# Patient Record
Sex: FEMALE | Race: WHITE | NOT HISPANIC OR LATINO | Employment: OTHER
[De-identification: names, ages, dates, MRNs, and addresses within clinical notes are randomized per-mention and may not be internally consistent; named-entity substitution may affect disease eponyms.]

---

## 2017-08-05 ENCOUNTER — HOSPITAL (OUTPATIENT)
Dept: OTHER | Age: 57
End: 2017-08-05
Attending: EMERGENCY MEDICINE

## 2018-02-20 ENCOUNTER — HOSPITAL ENCOUNTER (EMERGENCY)
Facility: HOSPITAL | Age: 58
Discharge: HOME | End: 2018-02-20
Payer: COMMERCIAL

## 2018-02-20 ENCOUNTER — APPOINTMENT (OUTPATIENT)
Dept: GENERAL RADIOLOGY | Facility: HOSPITAL | Age: 58
End: 2018-02-20
Payer: COMMERCIAL

## 2018-02-20 VITALS
RESPIRATION RATE: 18 BRPM | DIASTOLIC BLOOD PRESSURE: 77 MMHG | TEMPERATURE: 97 F | HEART RATE: 85 BPM | BODY MASS INDEX: 36.86 KG/M2 | WEIGHT: 208 LBS | OXYGEN SATURATION: 96 % | HEIGHT: 63 IN | SYSTOLIC BLOOD PRESSURE: 141 MMHG
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
## 2018-02-20 DIAGNOSIS — S39.012A LUMBAR STRAIN, INITIAL ENCOUNTER: ICD-10-CM

## 2018-02-20 DIAGNOSIS — M54.40 BACK PAIN OF LUMBAR REGION WITH SCIATICA: ICD-10-CM

## 2018-02-20 DIAGNOSIS — S16.1XXA STRAIN OF NECK MUSCLE, INITIAL ENCOUNTER: Primary | ICD-10-CM

## 2018-02-20 PROCEDURE — 96372 THER/PROPH/DIAG INJ SC/IM: CPT

## 2018-02-20 PROCEDURE — 72050 X-RAY EXAM NECK SPINE 4/5VWS: CPT

## 2018-02-20 PROCEDURE — 99284 EMERGENCY DEPT VISIT MOD MDM: CPT

## 2018-02-20 PROCEDURE — 72110 X-RAY EXAM L-2 SPINE 4/>VWS: CPT

## 2018-02-20 RX ORDER — ENALAPRIL MALEATE 20 MG/1
20 TABLET ORAL DAILY
COMMUNITY

## 2018-02-20 RX ORDER — KETOROLAC TROMETHAMINE 30 MG/ML
30 INJECTION, SOLUTION INTRAMUSCULAR; INTRAVENOUS ONCE
Status: COMPLETED | OUTPATIENT
Start: 2018-02-20 | End: 2018-02-20
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
## 2019-11-13 ENCOUNTER — HOSPITAL (OUTPATIENT)
Dept: OTHER | Age: 59
End: 2019-11-13
Attending: INTERNAL MEDICINE

## 2019-11-25 ENCOUNTER — HOSPITAL (OUTPATIENT)
Dept: OTHER | Age: 59
End: 2019-11-25
Attending: INTERNAL MEDICINE

## 2019-11-26 LAB — GLUCOSE BLDC GLUCOMTR-MCNC: 78 MG/DL (ref 70–99)

## 2019-12-26 ENCOUNTER — HOSPITAL (OUTPATIENT)
Dept: OTHER | Age: 59
End: 2019-12-26
Attending: INTERNAL MEDICINE

## 2020-01-24 ENCOUNTER — HOSPITAL (OUTPATIENT)
Dept: OTHER | Age: 60
End: 2020-01-24
Attending: INTERNAL MEDICINE

## 2020-01-31 LAB — PATH REPORT, FNA: NORMAL

## 2020-02-14 ENCOUNTER — HOSPITAL (OUTPATIENT)
Dept: OTHER | Age: 60
End: 2020-02-14
Attending: INTERNAL MEDICINE

## 2020-03-20 ENCOUNTER — HOSPITAL (OUTPATIENT)
Dept: OTHER | Age: 60
End: 2020-03-20
Attending: INTERNAL MEDICINE

## 2020-04-30 ENCOUNTER — HOSPITAL (OUTPATIENT)
Dept: OTHER | Age: 60
End: 2020-04-30
Attending: INTERNAL MEDICINE

## 2020-05-22 ENCOUNTER — HOSPITAL (OUTPATIENT)
Dept: OTHER | Age: 60
End: 2020-05-22
Attending: INTERNAL MEDICINE

## 2020-05-26 LAB — GLUCOSE BLDC GLUCOMTR-MCNC: 88 MG/DL (ref 70–99)

## 2021-12-09 ENCOUNTER — LAB REQUISITION (OUTPATIENT)
Dept: LAB | Age: 61
End: 2021-12-09

## 2021-12-09 ENCOUNTER — LAB SERVICES (OUTPATIENT)
Dept: LAB | Age: 61
End: 2021-12-09

## 2021-12-09 DIAGNOSIS — E03.9 HYPOTHYROIDISM, UNSPECIFIED: ICD-10-CM

## 2021-12-09 DIAGNOSIS — Z12.4 ENCOUNTER FOR SCREENING FOR MALIGNANT NEOPLASM OF CERVIX: ICD-10-CM

## 2021-12-09 DIAGNOSIS — E78.2 MIXED HYPERLIPIDEMIA: ICD-10-CM

## 2021-12-09 DIAGNOSIS — R53.83 OTHER FATIGUE: ICD-10-CM

## 2021-12-09 DIAGNOSIS — E55.9 VITAMIN D DEFICIENCY, UNSPECIFIED: ICD-10-CM

## 2021-12-09 DIAGNOSIS — R73.09 OTHER ABNORMAL GLUCOSE: ICD-10-CM

## 2021-12-09 LAB
BASOPHILS # BLD: 0 K/MCL (ref 0–0.3)
BASOPHILS NFR BLD: 1 %
DEPRECATED RDW RBC: 52 FL (ref 39–50)
EOSINOPHIL # BLD: 0.2 K/MCL (ref 0–0.5)
EOSINOPHIL NFR BLD: 3 %
ERYTHROCYTE [DISTWIDTH] IN BLOOD: 14.3 % (ref 11–15)
HCT VFR BLD CALC: 44.6 % (ref 36–46.5)
HGB BLD-MCNC: 14.8 G/DL (ref 12–15.5)
IMM GRANULOCYTES # BLD AUTO: 0 K/MCL (ref 0–0.2)
IMM GRANULOCYTES # BLD: 0 %
LYMPHOCYTES # BLD: 1.6 K/MCL (ref 1–4)
LYMPHOCYTES NFR BLD: 20 %
MCH RBC QN AUTO: 32.7 PG (ref 26–34)
MCHC RBC AUTO-ENTMCNC: 33.2 G/DL (ref 32–36.5)
MCV RBC AUTO: 98.5 FL (ref 78–100)
MONOCYTES # BLD: 0.6 K/MCL (ref 0.3–0.9)
MONOCYTES NFR BLD: 8 %
NEUTROPHILS # BLD: 5.2 K/MCL (ref 1.8–7.7)
NEUTROPHILS NFR BLD: 68 %
NRBC BLD MANUAL-RTO: 0 /100 WBC
PLATELET # BLD AUTO: 272 K/MCL (ref 140–450)
RBC # BLD: 4.53 MIL/MCL (ref 4–5.2)
WBC # BLD: 7.6 K/MCL (ref 4.2–11)

## 2021-12-09 PROCEDURE — 36415 COLL VENOUS BLD VENIPUNCTURE: CPT | Performed by: CLINICAL MEDICAL LABORATORY

## 2021-12-09 PROCEDURE — 87624 HPV HI-RISK TYP POOLED RSLT: CPT | Performed by: CLINICAL MEDICAL LABORATORY

## 2021-12-09 PROCEDURE — 83036 HEMOGLOBIN GLYCOSYLATED A1C: CPT | Performed by: CLINICAL MEDICAL LABORATORY

## 2021-12-09 PROCEDURE — 88175 CYTOPATH C/V AUTO FLUID REDO: CPT | Performed by: CLINICAL MEDICAL LABORATORY

## 2021-12-09 PROCEDURE — 80061 LIPID PANEL: CPT | Performed by: CLINICAL MEDICAL LABORATORY

## 2021-12-09 PROCEDURE — 80050 GENERAL HEALTH PANEL: CPT | Performed by: CLINICAL MEDICAL LABORATORY

## 2021-12-09 PROCEDURE — 84439 ASSAY OF FREE THYROXINE: CPT | Performed by: CLINICAL MEDICAL LABORATORY

## 2021-12-09 PROCEDURE — 82306 VITAMIN D 25 HYDROXY: CPT | Performed by: CLINICAL MEDICAL LABORATORY

## 2021-12-10 LAB
25(OH)D3+25(OH)D2 SERPL-MCNC: 42.4 NG/ML (ref 30–100)
ALBUMIN SERPL-MCNC: 3.6 G/DL (ref 3.6–5.1)
ALBUMIN/GLOB SERPL: 1 {RATIO} (ref 1–2.4)
ALP SERPL-CCNC: 71 UNITS/L (ref 45–117)
ALT SERPL-CCNC: 26 UNITS/L
ANION GAP SERPL CALC-SCNC: 9 MMOL/L (ref 10–20)
AST SERPL-CCNC: 22 UNITS/L
BILIRUB SERPL-MCNC: 0.4 MG/DL (ref 0.2–1)
BUN SERPL-MCNC: 9 MG/DL (ref 6–20)
BUN/CREAT SERPL: 14 (ref 7–25)
CALCIUM SERPL-MCNC: 10 MG/DL (ref 8.4–10.2)
CHLORIDE SERPL-SCNC: 106 MMOL/L (ref 98–107)
CHOLEST SERPL-MCNC: 258 MG/DL
CHOLEST/HDLC SERPL: 6.5 {RATIO}
CO2 SERPL-SCNC: 31 MMOL/L (ref 21–32)
CREAT SERPL-MCNC: 0.65 MG/DL (ref 0.51–0.95)
FASTING DURATION TIME PATIENT: ABNORMAL H
FASTING DURATION TIME PATIENT: ABNORMAL H
GFR SERPLBLD BASED ON 1.73 SQ M-ARVRAT: >90 ML/MIN
GLOBULIN SER-MCNC: 3.6 G/DL (ref 2–4)
GLUCOSE SERPL-MCNC: 92 MG/DL (ref 70–99)
HBA1C MFR BLD: 5.9 % (ref 4.5–5.6)
HDLC SERPL-MCNC: 40 MG/DL
LDLC SERPL CALC-MCNC: 172 MG/DL
NONHDLC SERPL-MCNC: 218 MG/DL
POTASSIUM SERPL-SCNC: 4.7 MMOL/L (ref 3.4–5.1)
PROT SERPL-MCNC: 7.2 G/DL (ref 6.4–8.2)
SODIUM SERPL-SCNC: 141 MMOL/L (ref 135–145)
T4 FREE SERPL-MCNC: 0.9 NG/DL (ref 0.8–1.5)
TRIGL SERPL-MCNC: 230 MG/DL
TSH SERPL-ACNC: 2.25 MCUNITS/ML (ref 0.35–5)

## 2021-12-14 LAB
CASE RPRT: NORMAL
CLINICAL INFO: NORMAL
CYTOLOGY CVX/VAG STUDY: NORMAL
HPV16+18+45 E6+E7MRNA CVX NAA+PROBE: NEGATIVE
Lab: NORMAL
PAP EDUCATIONAL NOTE: NORMAL
SPECIMEN ADEQUACY: NORMAL

## 2022-01-11 ENCOUNTER — IMAGING SERVICES (OUTPATIENT)
Dept: OTHER | Age: 62
End: 2022-01-11

## 2022-05-10 ENCOUNTER — TELEPHONE (OUTPATIENT)
Dept: CARDIOLOGY | Age: 62
End: 2022-05-10

## 2022-05-10 ENCOUNTER — APPOINTMENT (OUTPATIENT)
Dept: PET IMAGING | Age: 62
End: 2022-05-10
Attending: INTERNAL MEDICINE

## 2022-05-12 ENCOUNTER — HOSPITAL ENCOUNTER (OUTPATIENT)
Dept: PET IMAGING | Age: 62
Discharge: STILL ADMITTED OR ADMINISTRATIVE | End: 2022-05-12
Attending: INTERNAL MEDICINE

## 2022-05-12 VITALS — HEIGHT: 62 IN | WEIGHT: 215.61 LBS | BODY MASS INDEX: 39.68 KG/M2

## 2022-05-12 DIAGNOSIS — C34.90 SMALL CELL LUNG CANCER (CMD): ICD-10-CM

## 2022-05-12 LAB — GLUCOSE BLDC GLUCOMTR-MCNC: 105 MG/DL (ref 70–99)

## 2022-05-12 PROCEDURE — 78815 PET IMAGE W/CT SKULL-THIGH: CPT

## 2022-05-12 PROCEDURE — 10006150 HB RX 343: Performed by: INTERNAL MEDICINE

## 2022-05-12 PROCEDURE — A9552 F18 FDG: HCPCS | Performed by: INTERNAL MEDICINE

## 2022-05-12 PROCEDURE — 82962 GLUCOSE BLOOD TEST: CPT

## 2022-05-12 RX ORDER — FLUDEOXYGLUCOSE F-18 300 MCI/ML
16.3 INJECTION INTRAVENOUS ONCE
Status: COMPLETED | OUTPATIENT
Start: 2022-05-12 | End: 2022-05-12

## 2022-05-12 RX ADMIN — FLUDEOXYGLUCOSE F-18 16.3 MILLICURIE: 300 INJECTION INTRAVENOUS at 10:13

## 2022-08-18 ENCOUNTER — APPOINTMENT (OUTPATIENT)
Dept: CARDIOLOGY | Age: 62
DRG: 271 | End: 2022-08-18
Attending: STUDENT IN AN ORGANIZED HEALTH CARE EDUCATION/TRAINING PROGRAM

## 2022-08-18 ENCOUNTER — HOSPITAL ENCOUNTER (INPATIENT)
Age: 62
LOS: 5 days | Discharge: HOME | DRG: 271 | End: 2022-08-23
Attending: EMERGENCY MEDICINE | Admitting: INTERNAL MEDICINE

## 2022-08-18 ENCOUNTER — APPOINTMENT (OUTPATIENT)
Dept: CT IMAGING | Age: 62
DRG: 271 | End: 2022-08-18
Attending: STUDENT IN AN ORGANIZED HEALTH CARE EDUCATION/TRAINING PROGRAM

## 2022-08-18 ENCOUNTER — APPOINTMENT (OUTPATIENT)
Dept: GENERAL RADIOLOGY | Age: 62
DRG: 271 | End: 2022-08-18
Attending: EMERGENCY MEDICINE

## 2022-08-18 DIAGNOSIS — I31.39 PERICARDIAL EFFUSION: Primary | ICD-10-CM

## 2022-08-18 LAB
ABO + RH BLD: NORMAL
ALBUMIN SERPL-MCNC: 3.5 G/DL (ref 3.6–5.1)
ALBUMIN/GLOB SERPL: 1 {RATIO} (ref 1–2.4)
ALP SERPL-CCNC: 56 UNITS/L (ref 45–117)
ALT SERPL-CCNC: 25 UNITS/L
ANION GAP SERPL CALC-SCNC: 8 MMOL/L (ref 7–19)
AORTIC VALVE AREA: NORMAL
APTT PPP: 26 SEC (ref 22–30)
ASCENDING AORTA (AAD): 2.8
AST SERPL-CCNC: 23 UNITS/L
ATRIAL RATE (BPM): 99
AV MEAN GRADIENT (AVMG): NORMAL
AV MEAN VELOCITY (AVMV): NORMAL
AV PEAK GRADIENT (AVPG): NORMAL
AV PEAK VELOCITY (AVPV): NORMAL
AV STENOSIS SEVERITY TEXT: NORMAL
BASOPHILS # BLD: 0 K/MCL (ref 0–0.3)
BASOPHILS NFR BLD: 0 %
BILIRUB SERPL-MCNC: 0.4 MG/DL (ref 0.2–1)
BLD GP AB SCN SERPL QL GEL: NEGATIVE
BUN SERPL-MCNC: 8 MG/DL (ref 6–20)
BUN/CREAT SERPL: 12 (ref 7–25)
CALCIUM SERPL-MCNC: 9.7 MG/DL (ref 8.4–10.2)
CHLORIDE SERPL-SCNC: 109 MMOL/L (ref 97–110)
CO2 SERPL-SCNC: 26 MMOL/L (ref 21–32)
CREAT SERPL-MCNC: 0.68 MG/DL (ref 0.51–0.95)
DEPRECATED RDW RBC: 56.4 FL (ref 39–50)
E WAVE DECELARATION TIME (MDT): 137
EOSINOPHIL # BLD: 0 K/MCL (ref 0–0.5)
EOSINOPHIL NFR BLD: 0 %
ERYTHROCYTE [DISTWIDTH] IN BLOOD: 15.8 % (ref 11–15)
FASTING DURATION TIME PATIENT: ABNORMAL H
FLUAV RNA RESP QL NAA+PROBE: NOT DETECTED
FLUBV RNA RESP QL NAA+PROBE: NOT DETECTED
GFR SERPLBLD BASED ON 1.73 SQ M-ARVRAT: >90 ML/MIN
GLOBULIN SER-MCNC: 3.6 G/DL (ref 2–4)
GLUCOSE SERPL-MCNC: 102 MG/DL (ref 70–99)
HCT VFR BLD CALC: 35.2 % (ref 36–46.5)
HGB BLD-MCNC: 12 G/DL (ref 12–15.5)
IMM GRANULOCYTES # BLD AUTO: 0.1 K/MCL (ref 0–0.2)
IMM GRANULOCYTES # BLD: 1 %
INR PPP: 1
LEFT INTERNAL DIMENSION IN SYSTOLE (LVSD): 4.01
LEFT VENTRICULAR INTERNAL DIMENSION IN DIASTOLE (LVDD): 4.94
LV EF: NORMAL %
LYMPHOCYTES # BLD: 1.1 K/MCL (ref 1–4)
LYMPHOCYTES NFR BLD: 20 %
MCH RBC QN AUTO: 33.4 PG (ref 26–34)
MCHC RBC AUTO-ENTMCNC: 34.1 G/DL (ref 32–36.5)
MCV RBC AUTO: 98.1 FL (ref 78–100)
MONOCYTES # BLD: 0.7 K/MCL (ref 0.3–0.9)
MONOCYTES NFR BLD: 13 %
MV E TISSUE VEL LAT (MELV): 9.46
MV E TISSUE VEL MED (MESV): 7.62
MV E WAVE VEL/E TISSUE VEL MED(MSR): 15.49
NEUTROPHILS # BLD: 3.5 K/MCL (ref 1.8–7.7)
NEUTROPHILS NFR BLD: 66 %
NRBC BLD MANUAL-RTO: 0 /100 WBC
NT-PROBNP SERPL-MCNC: 78 PG/ML
P AXIS (DEGREES): 50
PLATELET # BLD AUTO: 157 K/MCL (ref 140–450)
POTASSIUM SERPL-SCNC: 4.1 MMOL/L (ref 3.4–5.1)
PR-INTERVAL (MSEC): 168
PROT SERPL-MCNC: 7.1 G/DL (ref 6.4–8.2)
PROTHROMBIN TIME: 10.4 SEC (ref 9.7–11.8)
QRS-INTERVAL (MSEC): 74
QT-INTERVAL (MSEC): 344
QTC: 441
R AXIS (DEGREES): 33
RAINBOW EXTRA TUBES HOLD SPECIMEN: NORMAL
RBC # BLD: 3.59 MIL/MCL (ref 4–5.2)
REPORT TEXT: NORMAL
RSV AG NPH QL IA.RAPID: NOT DETECTED
SARS-COV-2 RNA RESP QL NAA+PROBE: NOT DETECTED
SERVICE CMNT-IMP: NORMAL
SERVICE CMNT-IMP: NORMAL
SODIUM SERPL-SCNC: 139 MMOL/L (ref 135–145)
T AXIS (DEGREES): 61
TRICUSPID ANNULAR PLANE SYSTOLIC EXCURSION (TAPSE): 2.81
TROPONIN I SERPL DL<=0.01 NG/ML-MCNC: 6 NG/L
TYPE AND SCREEN EXPIRATION DATE: NORMAL
VENTRICULAR RATE EKG/MIN (BPM): 99
WBC # BLD: 5.3 K/MCL (ref 4.2–11)

## 2022-08-18 PROCEDURE — 80053 COMPREHEN METABOLIC PANEL: CPT | Performed by: EMERGENCY MEDICINE

## 2022-08-18 PROCEDURE — 71250 CT THORAX DX C-: CPT

## 2022-08-18 PROCEDURE — 93005 ELECTROCARDIOGRAM TRACING: CPT | Performed by: EMERGENCY MEDICINE

## 2022-08-18 PROCEDURE — 99285 EMERGENCY DEPT VISIT HI MDM: CPT | Performed by: EMERGENCY MEDICINE

## 2022-08-18 PROCEDURE — 0241U COVID/FLU/RSV PANEL: CPT | Performed by: EMERGENCY MEDICINE

## 2022-08-18 PROCEDURE — G1004 CDSM NDSC: HCPCS

## 2022-08-18 PROCEDURE — 10002803 HB RX 637: Performed by: STUDENT IN AN ORGANIZED HEALTH CARE EDUCATION/TRAINING PROGRAM

## 2022-08-18 PROCEDURE — C9803 HOPD COVID-19 SPEC COLLECT: HCPCS

## 2022-08-18 PROCEDURE — 85610 PROTHROMBIN TIME: CPT | Performed by: EMERGENCY MEDICINE

## 2022-08-18 PROCEDURE — 83880 ASSAY OF NATRIURETIC PEPTIDE: CPT | Performed by: EMERGENCY MEDICINE

## 2022-08-18 PROCEDURE — 10002800 HB RX 250 W HCPCS: Performed by: STUDENT IN AN ORGANIZED HEALTH CARE EDUCATION/TRAINING PROGRAM

## 2022-08-18 PROCEDURE — 71045 X-RAY EXAM CHEST 1 VIEW: CPT

## 2022-08-18 PROCEDURE — 93306 TTE W/DOPPLER COMPLETE: CPT | Performed by: INTERNAL MEDICINE

## 2022-08-18 PROCEDURE — 86900 BLOOD TYPING SEROLOGIC ABO: CPT | Performed by: EMERGENCY MEDICINE

## 2022-08-18 PROCEDURE — 93010 ELECTROCARDIOGRAM REPORT: CPT | Performed by: INTERNAL MEDICINE

## 2022-08-18 PROCEDURE — 85730 THROMBOPLASTIN TIME PARTIAL: CPT | Performed by: EMERGENCY MEDICINE

## 2022-08-18 PROCEDURE — 36415 COLL VENOUS BLD VENIPUNCTURE: CPT

## 2022-08-18 PROCEDURE — 84484 ASSAY OF TROPONIN QUANT: CPT | Performed by: EMERGENCY MEDICINE

## 2022-08-18 PROCEDURE — 85025 COMPLETE CBC W/AUTO DIFF WBC: CPT | Performed by: EMERGENCY MEDICINE

## 2022-08-18 PROCEDURE — 10006031 HB ROOM CHARGE TELEMETRY

## 2022-08-18 PROCEDURE — 99285 EMERGENCY DEPT VISIT HI MDM: CPT

## 2022-08-18 PROCEDURE — 10004651 HB RX, NO CHARGE ITEM: Performed by: STUDENT IN AN ORGANIZED HEALTH CARE EDUCATION/TRAINING PROGRAM

## 2022-08-18 PROCEDURE — 99223 1ST HOSP IP/OBS HIGH 75: CPT | Performed by: STUDENT IN AN ORGANIZED HEALTH CARE EDUCATION/TRAINING PROGRAM

## 2022-08-18 PROCEDURE — 93306 TTE W/DOPPLER COMPLETE: CPT

## 2022-08-18 RX ORDER — 0.9 % SODIUM CHLORIDE 0.9 %
2 VIAL (ML) INJECTION EVERY 12 HOURS SCHEDULED
Status: DISCONTINUED | OUTPATIENT
Start: 2022-08-18 | End: 2022-08-23 | Stop reason: HOSPADM

## 2022-08-18 RX ORDER — HEPARIN SODIUM 5000 [USP'U]/ML
5000 INJECTION, SOLUTION INTRAVENOUS; SUBCUTANEOUS EVERY 8 HOURS SCHEDULED
Status: DISCONTINUED | OUTPATIENT
Start: 2022-08-18 | End: 2022-08-19

## 2022-08-18 RX ORDER — SERTRALINE HYDROCHLORIDE 25 MG/1
25 TABLET, FILM COATED ORAL DAILY
Status: DISCONTINUED | OUTPATIENT
Start: 2022-08-18 | End: 2022-08-23 | Stop reason: HOSPADM

## 2022-08-18 RX ORDER — SERTRALINE HYDROCHLORIDE 25 MG/1
25 TABLET, FILM COATED ORAL DAILY
COMMUNITY
Start: 2022-06-19

## 2022-08-18 RX ORDER — VITAMIN B COMPLEX
1 TABLET ORAL DAILY
COMMUNITY

## 2022-08-18 RX ADMIN — SERTRALINE HYDROCHLORIDE 25 MG: 25 TABLET, FILM COATED ORAL at 17:08

## 2022-08-18 RX ADMIN — HEPARIN SODIUM 5000 UNITS: 5000 INJECTION INTRAVENOUS; SUBCUTANEOUS at 22:20

## 2022-08-18 RX ADMIN — HEPARIN SODIUM 5000 UNITS: 5000 INJECTION INTRAVENOUS; SUBCUTANEOUS at 17:09

## 2022-08-18 RX ADMIN — SODIUM CHLORIDE, PRESERVATIVE FREE 2 ML: 5 INJECTION INTRAVENOUS at 22:24

## 2022-08-18 ASSESSMENT — ENCOUNTER SYMPTOMS
SORE THROAT: 0
HEADACHES: 0
ABDOMINAL PAIN: 0
RHINORRHEA: 0
FATIGUE: 1
DIARRHEA: 0
CHILLS: 0
POLYDIPSIA: 0
ADENOPATHY: 0
FEVER: 0
WEAKNESS: 0
NUMBNESS: 0
COLOR CHANGE: 0
SHORTNESS OF BREATH: 0
NAUSEA: 0
VOMITING: 0
COUGH: 0
CONSTIPATION: 0

## 2022-08-18 ASSESSMENT — ACTIVITIES OF DAILY LIVING (ADL)
ADL_SCORE: 12
ADL_BEFORE_ADMISSION: INDEPENDENT
CHRONIC_PAIN_PRESENT: NO
RECENT_DECLINE_ADL: NO
ADL_SHORT_OF_BREATH: NO

## 2022-08-18 ASSESSMENT — LIFESTYLE VARIABLES
AUDIT-C TOTAL SCORE: 0
HOW OFTEN DO YOU HAVE A DRINK CONTAINING ALCOHOL: NEVER
HOW OFTEN DO YOU HAVE 6 OR MORE DRINKS ON ONE OCCASION: NEVER
ALCOHOL_USE_STATUS: NO OR LOW RISK WITH VALIDATED TOOL
HOW MANY STANDARD DRINKS CONTAINING ALCOHOL DO YOU HAVE ON A TYPICAL DAY: 0,1 OR 2

## 2022-08-18 ASSESSMENT — PAIN SCALES - GENERAL
PAINLEVEL_OUTOF10: 0

## 2022-08-18 ASSESSMENT — COLUMBIA-SUICIDE SEVERITY RATING SCALE - C-SSRS
IS THE PATIENT ABLE TO COMPLETE C-SSRS: YES
6. HAVE YOU EVER DONE ANYTHING, STARTED TO DO ANYTHING, OR PREPARED TO DO ANYTHING TO END YOUR LIFE?: NO
2. HAVE YOU ACTUALLY HAD ANY THOUGHTS OF KILLING YOURSELF?: NO
1. WITHIN THE PAST MONTH, HAVE YOU WISHED YOU WERE DEAD OR WISHED YOU COULD GO TO SLEEP AND NOT WAKE UP?: NO

## 2022-08-18 ASSESSMENT — COGNITIVE AND FUNCTIONAL STATUS - GENERAL
DO YOU HAVE DIFFICULTY DRESSING OR BATHING: NO
BECAUSE OF A PHYSICAL, MENTAL, OR EMOTIONAL CONDITION, DO YOU HAVE DIFFICULTY DOING ERRANDS ALONE: NO
ARE YOU DEAF OR DO YOU HAVE SERIOUS DIFFICULTY  HEARING: NO
ARE YOU BLIND OR DO YOU HAVE SERIOUS DIFFICULTY SEEING, EVEN WHEN WEARING GLASSES: NO
BECAUSE OF A PHYSICAL, MENTAL, OR EMOTIONAL CONDITION, DO YOU HAVE SERIOUS DIFFICULTY CONCENTRATING, REMEMBERING OR MAKING DECISIONS: NO
DO YOU HAVE SERIOUS DIFFICULTY WALKING OR CLIMBING STAIRS: NO

## 2022-08-18 ASSESSMENT — PATIENT HEALTH QUESTIONNAIRE - PHQ9
SUM OF ALL RESPONSES TO PHQ9 QUESTIONS 1 AND 2: 1
CLINICAL INTERPRETATION OF PHQ2 SCORE: NO FURTHER SCREENING NEEDED
IS PATIENT ABLE TO COMPLETE PHQ2 OR PHQ9: YES
2. FEELING DOWN, DEPRESSED OR HOPELESS: SEVERAL DAYS
1. LITTLE INTEREST OR PLEASURE IN DOING THINGS: NOT AT ALL
SUM OF ALL RESPONSES TO PHQ9 QUESTIONS 1 AND 2: 1

## 2022-08-18 ASSESSMENT — PAIN SCALES - WONG BAKER: WONGBAKER_NUMERICALRESPONSE: 1

## 2022-08-19 ENCOUNTER — ANESTHESIA (OUTPATIENT)
Dept: SURGERY | Age: 62
DRG: 271 | End: 2022-08-19

## 2022-08-19 ENCOUNTER — APPOINTMENT (OUTPATIENT)
Dept: GENERAL RADIOLOGY | Age: 62
DRG: 271 | End: 2022-08-19

## 2022-08-19 ENCOUNTER — ANESTHESIA EVENT (OUTPATIENT)
Dept: SURGERY | Age: 62
DRG: 271 | End: 2022-08-19

## 2022-08-19 LAB
ALBUMIN SERPL-MCNC: 3.3 G/DL (ref 3.6–5.1)
ALBUMIN/GLOB SERPL: 0.9 {RATIO} (ref 1–2.4)
ALP SERPL-CCNC: 54 UNITS/L (ref 45–117)
ALT SERPL-CCNC: 26 UNITS/L
ANION GAP SERPL CALC-SCNC: 9 MMOL/L (ref 7–19)
AST SERPL-CCNC: 26 UNITS/L
BASOPHILS # BLD: 0 K/MCL (ref 0–0.3)
BASOPHILS NFR BLD: 0 %
BILIRUB SERPL-MCNC: 0.4 MG/DL (ref 0.2–1)
BUN SERPL-MCNC: 7 MG/DL (ref 6–20)
BUN/CREAT SERPL: 10 (ref 7–25)
CALCIUM SERPL-MCNC: 9.9 MG/DL (ref 8.4–10.2)
CHLORIDE SERPL-SCNC: 107 MMOL/L (ref 97–110)
CO2 SERPL-SCNC: 29 MMOL/L (ref 21–32)
CREAT SERPL-MCNC: 0.68 MG/DL (ref 0.51–0.95)
DEPRECATED RDW RBC: 55.6 FL (ref 39–50)
EOSINOPHIL # BLD: 0 K/MCL (ref 0–0.5)
EOSINOPHIL NFR BLD: 1 %
ERYTHROCYTE [DISTWIDTH] IN BLOOD: 15.7 % (ref 11–15)
FASTING DURATION TIME PATIENT: ABNORMAL H
GFR SERPLBLD BASED ON 1.73 SQ M-ARVRAT: >90 ML/MIN
GLOBULIN SER-MCNC: 3.7 G/DL (ref 2–4)
GLUCOSE SERPL-MCNC: 104 MG/DL (ref 70–99)
HCT VFR BLD CALC: 37.1 % (ref 36–46.5)
HGB BLD-MCNC: 12.5 G/DL (ref 12–15.5)
IMM GRANULOCYTES # BLD AUTO: 0 K/MCL (ref 0–0.2)
IMM GRANULOCYTES # BLD: 0 %
LYMPHOCYTES # BLD: 1.2 K/MCL (ref 1–4)
LYMPHOCYTES NFR BLD: 29 %
MAGNESIUM SERPL-MCNC: 1.8 MG/DL (ref 1.7–2.4)
MCH RBC QN AUTO: 32.9 PG (ref 26–34)
MCHC RBC AUTO-ENTMCNC: 33.7 G/DL (ref 32–36.5)
MCV RBC AUTO: 97.6 FL (ref 78–100)
MONOCYTES # BLD: 0.6 K/MCL (ref 0.3–0.9)
MONOCYTES NFR BLD: 15 %
NEUTROPHILS # BLD: 2.4 K/MCL (ref 1.8–7.7)
NEUTROPHILS NFR BLD: 55 %
NRBC BLD MANUAL-RTO: 0 /100 WBC
PLATELET # BLD AUTO: 153 K/MCL (ref 140–450)
POTASSIUM SERPL-SCNC: 4.5 MMOL/L (ref 3.4–5.1)
PROT SERPL-MCNC: 7 G/DL (ref 6.4–8.2)
RBC # BLD: 3.8 MIL/MCL (ref 4–5.2)
SODIUM SERPL-SCNC: 140 MMOL/L (ref 135–145)
TSH SERPL-ACNC: 4.49 MCUNITS/ML (ref 0.35–5)
WBC # BLD: 4.3 K/MCL (ref 4.2–11)

## 2022-08-19 PROCEDURE — 0W9D0ZZ DRAINAGE OF PERICARDIAL CAVITY, OPEN APPROACH: ICD-10-PCS | Performed by: THORACIC SURGERY (CARDIOTHORACIC VASCULAR SURGERY)

## 2022-08-19 PROCEDURE — 10002801 HB RX 250 W/O HCPCS: Performed by: STUDENT IN AN ORGANIZED HEALTH CARE EDUCATION/TRAINING PROGRAM

## 2022-08-19 PROCEDURE — 13000002 HB ANESTHESIA  GENERAL  S/U + 1ST 15 MIN: Performed by: THORACIC SURGERY (CARDIOTHORACIC VASCULAR SURGERY)

## 2022-08-19 PROCEDURE — 10004651 HB RX, NO CHARGE ITEM: Performed by: STUDENT IN AN ORGANIZED HEALTH CARE EDUCATION/TRAINING PROGRAM

## 2022-08-19 PROCEDURE — 10002801 HB RX 250 W/O HCPCS: Performed by: THORACIC SURGERY (CARDIOTHORACIC VASCULAR SURGERY)

## 2022-08-19 PROCEDURE — 88341 IMHCHEM/IMCYTCHM EA ADD ANTB: CPT | Performed by: THORACIC SURGERY (CARDIOTHORACIC VASCULAR SURGERY)

## 2022-08-19 PROCEDURE — C1729 CATH, DRAINAGE: HCPCS | Performed by: THORACIC SURGERY (CARDIOTHORACIC VASCULAR SURGERY)

## 2022-08-19 PROCEDURE — 0W9B00Z DRAINAGE OF LEFT PLEURAL CAVITY WITH DRAINAGE DEVICE, OPEN APPROACH: ICD-10-PCS | Performed by: THORACIC SURGERY (CARDIOTHORACIC VASCULAR SURGERY)

## 2022-08-19 PROCEDURE — 10002807 HB RX 258: Performed by: STUDENT IN AN ORGANIZED HEALTH CARE EDUCATION/TRAINING PROGRAM

## 2022-08-19 PROCEDURE — 85025 COMPLETE CBC W/AUTO DIFF WBC: CPT | Performed by: STUDENT IN AN ORGANIZED HEALTH CARE EDUCATION/TRAINING PROGRAM

## 2022-08-19 PROCEDURE — 87205 SMEAR GRAM STAIN: CPT | Performed by: THORACIC SURGERY (CARDIOTHORACIC VASCULAR SURGERY)

## 2022-08-19 PROCEDURE — 13000102 HB CARDIO-THORACIC COMPLEX CASE S/U + 1ST 15 MIN: Performed by: THORACIC SURGERY (CARDIOTHORACIC VASCULAR SURGERY)

## 2022-08-19 PROCEDURE — 10006023 HB SUPPLY 272: Performed by: THORACIC SURGERY (CARDIOTHORACIC VASCULAR SURGERY)

## 2022-08-19 PROCEDURE — 10002800 HB RX 250 W HCPCS: Performed by: STUDENT IN AN ORGANIZED HEALTH CARE EDUCATION/TRAINING PROGRAM

## 2022-08-19 PROCEDURE — 80053 COMPREHEN METABOLIC PANEL: CPT | Performed by: STUDENT IN AN ORGANIZED HEALTH CARE EDUCATION/TRAINING PROGRAM

## 2022-08-19 PROCEDURE — 10003585 HB ROOM CHARGE INTERMEDIATE CARE

## 2022-08-19 PROCEDURE — 10002803 HB RX 637: Performed by: STUDENT IN AN ORGANIZED HEALTH CARE EDUCATION/TRAINING PROGRAM

## 2022-08-19 PROCEDURE — 13000103 HB CARDIO-THORACIC COMPLEX CASE EA ADD MINUTE: Performed by: THORACIC SURGERY (CARDIOTHORACIC VASCULAR SURGERY)

## 2022-08-19 PROCEDURE — 13000003 HB ANESTHESIA  GENERAL EA ADD MINUTE: Performed by: THORACIC SURGERY (CARDIOTHORACIC VASCULAR SURGERY)

## 2022-08-19 PROCEDURE — 0BJ08ZZ INSPECTION OF TRACHEOBRONCHIAL TREE, VIA NATURAL OR ARTIFICIAL OPENING ENDOSCOPIC: ICD-10-PCS | Performed by: THORACIC SURGERY (CARDIOTHORACIC VASCULAR SURGERY)

## 2022-08-19 PROCEDURE — 36415 COLL VENOUS BLD VENIPUNCTURE: CPT | Performed by: STUDENT IN AN ORGANIZED HEALTH CARE EDUCATION/TRAINING PROGRAM

## 2022-08-19 PROCEDURE — 88305 TISSUE EXAM BY PATHOLOGIST: CPT | Performed by: THORACIC SURGERY (CARDIOTHORACIC VASCULAR SURGERY)

## 2022-08-19 PROCEDURE — 71045 X-RAY EXAM CHEST 1 VIEW: CPT

## 2022-08-19 PROCEDURE — 10004452 HB PACU ADDL 30 MINUTES: Performed by: THORACIC SURGERY (CARDIOTHORACIC VASCULAR SURGERY)

## 2022-08-19 PROCEDURE — 84443 ASSAY THYROID STIM HORMONE: CPT | Performed by: STUDENT IN AN ORGANIZED HEALTH CARE EDUCATION/TRAINING PROGRAM

## 2022-08-19 PROCEDURE — 10004451 HB PACU RECOVERY 1ST 30 MINUTES: Performed by: THORACIC SURGERY (CARDIOTHORACIC VASCULAR SURGERY)

## 2022-08-19 PROCEDURE — 83735 ASSAY OF MAGNESIUM: CPT | Performed by: STUDENT IN AN ORGANIZED HEALTH CARE EDUCATION/TRAINING PROGRAM

## 2022-08-19 RX ORDER — SODIUM CHLORIDE 9 MG/ML
INJECTION, SOLUTION INTRAVENOUS CONTINUOUS
Status: DISCONTINUED | OUTPATIENT
Start: 2022-08-19 | End: 2022-08-19

## 2022-08-19 RX ORDER — BUPIVACAINE HYDROCHLORIDE AND EPINEPHRINE 2.5; 5 MG/ML; UG/ML
INJECTION, SOLUTION EPIDURAL; INFILTRATION; INTRACAUDAL; PERINEURAL PRN
Status: DISCONTINUED | OUTPATIENT
Start: 2022-08-19 | End: 2022-08-19 | Stop reason: HOSPADM

## 2022-08-19 RX ORDER — HYDROMORPHONE HCL IN 0.9% NACL 0.2 MG/ML
PLASTIC BAG, INJECTION (ML) INTRAVENOUS CONTINUOUS
Status: DISCONTINUED | OUTPATIENT
Start: 2022-08-19 | End: 2022-08-23 | Stop reason: HOSPADM

## 2022-08-19 RX ORDER — ROCURONIUM BROMIDE 10 MG/ML
INJECTION, SOLUTION INTRAVENOUS PRN
Status: DISCONTINUED | OUTPATIENT
Start: 2022-08-19 | End: 2022-08-19

## 2022-08-19 RX ORDER — DEXAMETHASONE SODIUM PHOSPHATE 4 MG/ML
INJECTION, SOLUTION INTRA-ARTICULAR; INTRALESIONAL; INTRAMUSCULAR; INTRAVENOUS; SOFT TISSUE PRN
Status: DISCONTINUED | OUTPATIENT
Start: 2022-08-19 | End: 2022-08-19

## 2022-08-19 RX ORDER — PROPOFOL 10 MG/ML
INJECTION, EMULSION INTRAVENOUS PRN
Status: DISCONTINUED | OUTPATIENT
Start: 2022-08-19 | End: 2022-08-19

## 2022-08-19 RX ORDER — HEPARIN SODIUM 5000 [USP'U]/ML
5000 INJECTION, SOLUTION INTRAVENOUS; SUBCUTANEOUS EVERY 8 HOURS SCHEDULED
Status: DISCONTINUED | OUTPATIENT
Start: 2022-08-20 | End: 2022-08-23 | Stop reason: HOSPADM

## 2022-08-19 RX ORDER — LIDOCAINE HYDROCHLORIDE 20 MG/ML
INJECTION, SOLUTION INFILTRATION; PERINEURAL PRN
Status: DISCONTINUED | OUTPATIENT
Start: 2022-08-19 | End: 2022-08-19

## 2022-08-19 RX ORDER — NALBUPHINE HYDROCHLORIDE 10 MG/ML
2.5 INJECTION, SOLUTION INTRAMUSCULAR; INTRAVENOUS; SUBCUTANEOUS EVERY 8 HOURS PRN
Status: DISCONTINUED | OUTPATIENT
Start: 2022-08-19 | End: 2022-08-23 | Stop reason: HOSPADM

## 2022-08-19 RX ORDER — SODIUM CHLORIDE, SODIUM LACTATE, POTASSIUM CHLORIDE, CALCIUM CHLORIDE 600; 310; 30; 20 MG/100ML; MG/100ML; MG/100ML; MG/100ML
INJECTION, SOLUTION INTRAVENOUS CONTINUOUS PRN
Status: DISCONTINUED | OUTPATIENT
Start: 2022-08-19 | End: 2022-08-19

## 2022-08-19 RX ORDER — ACETAMINOPHEN 10 MG/ML
1000 INJECTION, SOLUTION INTRAVENOUS EVERY 8 HOURS
Status: DISCONTINUED | OUTPATIENT
Start: 2022-08-19 | End: 2022-08-19 | Stop reason: ALTCHOICE

## 2022-08-19 RX ORDER — HYDROMORPHONE HCL IN 0.9% NACL 0.2 MG/ML
PLASTIC BAG, INJECTION (ML) INTRAVENOUS
Status: COMPLETED
Start: 2022-08-19 | End: 2022-08-20

## 2022-08-19 RX ORDER — ONDANSETRON 2 MG/ML
INJECTION INTRAMUSCULAR; INTRAVENOUS PRN
Status: DISCONTINUED | OUTPATIENT
Start: 2022-08-19 | End: 2022-08-19

## 2022-08-19 RX ORDER — ATORVASTATIN CALCIUM 20 MG/1
20 TABLET, FILM COATED ORAL NIGHTLY
Status: DISCONTINUED | OUTPATIENT
Start: 2022-08-19 | End: 2022-08-23 | Stop reason: HOSPADM

## 2022-08-19 RX ORDER — SODIUM CHLORIDE, SODIUM LACTATE, POTASSIUM CHLORIDE, CALCIUM CHLORIDE 600; 310; 30; 20 MG/100ML; MG/100ML; MG/100ML; MG/100ML
INJECTION, SOLUTION INTRAVENOUS CONTINUOUS
Status: DISCONTINUED | OUTPATIENT
Start: 2022-08-19 | End: 2022-08-23 | Stop reason: HOSPADM

## 2022-08-19 RX ORDER — NALOXONE HCL 0.4 MG/ML
0.1 VIAL (ML) INJECTION PRN
Status: DISCONTINUED | OUTPATIENT
Start: 2022-08-19 | End: 2022-08-23 | Stop reason: HOSPADM

## 2022-08-19 RX ADMIN — SODIUM CHLORIDE, POTASSIUM CHLORIDE, SODIUM LACTATE AND CALCIUM CHLORIDE: 600; 310; 30; 20 INJECTION, SOLUTION INTRAVENOUS at 20:57

## 2022-08-19 RX ADMIN — FENTANYL CITRATE 25 MCG: 50 INJECTION INTRAMUSCULAR; INTRAVENOUS at 20:50

## 2022-08-19 RX ADMIN — Medication: at 21:37

## 2022-08-19 RX ADMIN — SERTRALINE HYDROCHLORIDE 25 MG: 25 TABLET, FILM COATED ORAL at 08:39

## 2022-08-19 RX ADMIN — FENTANYL CITRATE 25 MCG: 50 INJECTION INTRAMUSCULAR; INTRAVENOUS at 20:56

## 2022-08-19 RX ADMIN — SUGAMMADEX 200 MG: 100 INJECTION, SOLUTION INTRAVENOUS at 20:16

## 2022-08-19 RX ADMIN — FENTANYL CITRATE 25 MCG: 50 INJECTION, SOLUTION INTRAMUSCULAR; INTRAVENOUS at 20:00

## 2022-08-19 RX ADMIN — HYDROMORPHONE HYDROCHLORIDE 0.2 MG: 1 INJECTION, SOLUTION INTRAMUSCULAR; INTRAVENOUS; SUBCUTANEOUS at 20:35

## 2022-08-19 RX ADMIN — FENTANYL CITRATE 25 MCG: 50 INJECTION INTRAMUSCULAR; INTRAVENOUS at 20:40

## 2022-08-19 RX ADMIN — LIDOCAINE HYDROCHLORIDE 3 ML: 20 INJECTION, SOLUTION INFILTRATION; PERINEURAL at 19:12

## 2022-08-19 RX ADMIN — HYDROMORPHONE HYDROCHLORIDE 0.2 MG: 1 INJECTION, SOLUTION INTRAMUSCULAR; INTRAVENOUS; SUBCUTANEOUS at 20:56

## 2022-08-19 RX ADMIN — PROPOFOL 50 MG: 10 INJECTION, EMULSION INTRAVENOUS at 19:51

## 2022-08-19 RX ADMIN — HYDROMORPHONE HYDROCHLORIDE 0.2 MG: 1 INJECTION, SOLUTION INTRAMUSCULAR; INTRAVENOUS; SUBCUTANEOUS at 20:45

## 2022-08-19 RX ADMIN — ROCURONIUM BROMIDE 30 MG: 10 INJECTION INTRAVENOUS at 19:16

## 2022-08-19 RX ADMIN — SODIUM CHLORIDE, PRESERVATIVE FREE 2 ML: 5 INJECTION INTRAVENOUS at 08:40

## 2022-08-19 RX ADMIN — PROPOFOL 100 MG: 10 INJECTION, EMULSION INTRAVENOUS at 19:12

## 2022-08-19 RX ADMIN — FENTANYL CITRATE 25 MCG: 50 INJECTION INTRAMUSCULAR; INTRAVENOUS at 20:35

## 2022-08-19 RX ADMIN — HYDROMORPHONE HYDROCHLORIDE 0.2 MG: 1 INJECTION, SOLUTION INTRAMUSCULAR; INTRAVENOUS; SUBCUTANEOUS at 20:50

## 2022-08-19 RX ADMIN — Medication 100 MG: at 19:12

## 2022-08-19 RX ADMIN — HYDROMORPHONE HYDROCHLORIDE 0.2 MG: 1 INJECTION, SOLUTION INTRAMUSCULAR; INTRAVENOUS; SUBCUTANEOUS at 20:40

## 2022-08-19 RX ADMIN — FENTANYL CITRATE 25 MCG: 50 INJECTION, SOLUTION INTRAMUSCULAR; INTRAVENOUS at 20:10

## 2022-08-19 RX ADMIN — DEXAMETHASONE SODIUM PHOSPHATE 4 MG: 4 INJECTION, SOLUTION INTRAMUSCULAR; INTRAVENOUS at 20:01

## 2022-08-19 RX ADMIN — FENTANYL CITRATE 25 MCG: 50 INJECTION, SOLUTION INTRAMUSCULAR; INTRAVENOUS at 19:34

## 2022-08-19 RX ADMIN — FENTANYL CITRATE 25 MCG: 50 INJECTION, SOLUTION INTRAMUSCULAR; INTRAVENOUS at 19:12

## 2022-08-19 RX ADMIN — CEFAZOLIN SODIUM 2000 MG: 300 INJECTION, POWDER, LYOPHILIZED, FOR SOLUTION INTRAVENOUS at 19:32

## 2022-08-19 RX ADMIN — SODIUM CHLORIDE, POTASSIUM CHLORIDE, SODIUM LACTATE AND CALCIUM CHLORIDE: 600; 310; 30; 20 INJECTION, SOLUTION INTRAVENOUS at 19:04

## 2022-08-19 RX ADMIN — ONDANSETRON 4 MG: 2 INJECTION INTRAMUSCULAR; INTRAVENOUS at 20:01

## 2022-08-19 ASSESSMENT — COGNITIVE AND FUNCTIONAL STATUS - GENERAL
BECAUSE OF A PHYSICAL, MENTAL, OR EMOTIONAL CONDITION, DO YOU HAVE SERIOUS DIFFICULTY CONCENTRATING, REMEMBERING OR MAKING DECISIONS: NO
DO YOU HAVE SERIOUS DIFFICULTY WALKING OR CLIMBING STAIRS: NO
BECAUSE OF A PHYSICAL, MENTAL, OR EMOTIONAL CONDITION, DO YOU HAVE DIFFICULTY DOING ERRANDS ALONE: NO
DO YOU HAVE DIFFICULTY DRESSING OR BATHING: NO

## 2022-08-19 ASSESSMENT — PAIN SCALES - GENERAL
PAINLEVEL_OUTOF10: 0
PAINLEVEL_OUTOF10: 9
PAINLEVEL_OUTOF10: 0
PAINLEVEL_OUTOF10: 8
PAINLEVEL_OUTOF10: 9
PAINLEVEL_OUTOF10: 9
PAINLEVEL_OUTOF10: 0
PAINLEVEL_OUTOF10: 6
PAINLEVEL_OUTOF10: 8
PAINLEVEL_OUTOF10: 0

## 2022-08-20 ENCOUNTER — APPOINTMENT (OUTPATIENT)
Dept: GENERAL RADIOLOGY | Age: 62
DRG: 271 | End: 2022-08-20
Attending: STUDENT IN AN ORGANIZED HEALTH CARE EDUCATION/TRAINING PROGRAM

## 2022-08-20 LAB
ANION GAP SERPL CALC-SCNC: 8 MMOL/L (ref 7–19)
ATRIAL RATE (BPM): 97
BASOPHILS # BLD: 0 K/MCL (ref 0–0.3)
BASOPHILS NFR BLD: 0 %
BUN SERPL-MCNC: 11 MG/DL (ref 6–20)
BUN/CREAT SERPL: 19 (ref 7–25)
CALCIUM SERPL-MCNC: 9.2 MG/DL (ref 8.4–10.2)
CHLORIDE SERPL-SCNC: 104 MMOL/L (ref 97–110)
CO2 SERPL-SCNC: 27 MMOL/L (ref 21–32)
CREAT SERPL-MCNC: 0.57 MG/DL (ref 0.51–0.95)
DEPRECATED RDW RBC: 57.1 FL (ref 39–50)
EOSINOPHIL # BLD: 0 K/MCL (ref 0–0.5)
EOSINOPHIL NFR BLD: 0 %
ERYTHROCYTE [DISTWIDTH] IN BLOOD: 15.4 % (ref 11–15)
FASTING DURATION TIME PATIENT: ABNORMAL H
GFR SERPLBLD BASED ON 1.73 SQ M-ARVRAT: >90 ML/MIN
GLUCOSE SERPL-MCNC: 157 MG/DL (ref 70–99)
HCT VFR BLD CALC: 37.8 % (ref 36–46.5)
HGB BLD-MCNC: 12.2 G/DL (ref 12–15.5)
IMM GRANULOCYTES # BLD AUTO: 0 K/MCL (ref 0–0.2)
IMM GRANULOCYTES # BLD: 0 %
LYMPHOCYTES # BLD: 0.3 K/MCL (ref 1–4)
LYMPHOCYTES NFR BLD: 5 %
MAGNESIUM SERPL-MCNC: 1.6 MG/DL (ref 1.7–2.4)
MCH RBC QN AUTO: 32.4 PG (ref 26–34)
MCHC RBC AUTO-ENTMCNC: 32.3 G/DL (ref 32–36.5)
MCV RBC AUTO: 100.5 FL (ref 78–100)
MONOCYTES # BLD: 0.7 K/MCL (ref 0.3–0.9)
MONOCYTES NFR BLD: 10 %
NEUTROPHILS # BLD: 5.9 K/MCL (ref 1.8–7.7)
NEUTROPHILS NFR BLD: 85 %
NRBC BLD MANUAL-RTO: 0 /100 WBC
P AXIS (DEGREES): 41
PHOSPHATE SERPL-MCNC: 4.4 MG/DL (ref 2.4–4.7)
PLATELET # BLD AUTO: 148 K/MCL (ref 140–450)
POTASSIUM SERPL-SCNC: 4.2 MMOL/L (ref 3.4–5.1)
PR-INTERVAL (MSEC): 198
QRS-INTERVAL (MSEC): 82
QT-INTERVAL (MSEC): 350
QTC: 445
R AXIS (DEGREES): 0
RBC # BLD: 3.76 MIL/MCL (ref 4–5.2)
REPORT TEXT: NORMAL
SODIUM SERPL-SCNC: 135 MMOL/L (ref 135–145)
T AXIS (DEGREES): 23
VENTRICULAR RATE EKG/MIN (BPM): 97
WBC # BLD: 7 K/MCL (ref 4.2–11)

## 2022-08-20 PROCEDURE — 36415 COLL VENOUS BLD VENIPUNCTURE: CPT

## 2022-08-20 PROCEDURE — 85025 COMPLETE CBC W/AUTO DIFF WBC: CPT

## 2022-08-20 PROCEDURE — 10002800 HB RX 250 W HCPCS: Performed by: STUDENT IN AN ORGANIZED HEALTH CARE EDUCATION/TRAINING PROGRAM

## 2022-08-20 PROCEDURE — 10002803 HB RX 637: Performed by: INTERNAL MEDICINE

## 2022-08-20 PROCEDURE — 83735 ASSAY OF MAGNESIUM: CPT

## 2022-08-20 PROCEDURE — 10002800 HB RX 250 W HCPCS

## 2022-08-20 PROCEDURE — 93010 ELECTROCARDIOGRAM REPORT: CPT | Performed by: INTERNAL MEDICINE

## 2022-08-20 PROCEDURE — 10002803 HB RX 637: Performed by: STUDENT IN AN ORGANIZED HEALTH CARE EDUCATION/TRAINING PROGRAM

## 2022-08-20 PROCEDURE — 10003585 HB ROOM CHARGE INTERMEDIATE CARE

## 2022-08-20 PROCEDURE — 84100 ASSAY OF PHOSPHORUS: CPT

## 2022-08-20 PROCEDURE — 93005 ELECTROCARDIOGRAM TRACING: CPT | Performed by: INTERNAL MEDICINE

## 2022-08-20 PROCEDURE — 80048 BASIC METABOLIC PNL TOTAL CA: CPT

## 2022-08-20 PROCEDURE — 10002807 HB RX 258: Performed by: STUDENT IN AN ORGANIZED HEALTH CARE EDUCATION/TRAINING PROGRAM

## 2022-08-20 PROCEDURE — 71045 X-RAY EXAM CHEST 1 VIEW: CPT

## 2022-08-20 PROCEDURE — 10004651 HB RX, NO CHARGE ITEM: Performed by: STUDENT IN AN ORGANIZED HEALTH CARE EDUCATION/TRAINING PROGRAM

## 2022-08-20 RX ORDER — COLCHICINE 0.6 MG/1
0.6 TABLET ORAL EVERY OTHER DAY
Status: DISCONTINUED | OUTPATIENT
Start: 2022-08-20 | End: 2022-08-23 | Stop reason: HOSPADM

## 2022-08-20 RX ORDER — ACETAMINOPHEN 500 MG
1000 TABLET ORAL EVERY 6 HOURS PRN
Status: DISCONTINUED | OUTPATIENT
Start: 2022-08-20 | End: 2022-08-23 | Stop reason: HOSPADM

## 2022-08-20 RX ADMIN — SODIUM CHLORIDE, PRESERVATIVE FREE 2 ML: 5 INJECTION INTRAVENOUS at 21:09

## 2022-08-20 RX ADMIN — Medication: at 18:00

## 2022-08-20 RX ADMIN — SERTRALINE HYDROCHLORIDE 25 MG: 25 TABLET, FILM COATED ORAL at 08:35

## 2022-08-20 RX ADMIN — COLCHICINE 0.6 MG: 0.6 TABLET, FILM COATED ORAL at 15:40

## 2022-08-20 RX ADMIN — SODIUM CHLORIDE, POTASSIUM CHLORIDE, SODIUM LACTATE AND CALCIUM CHLORIDE: 600; 310; 30; 20 INJECTION, SOLUTION INTRAVENOUS at 18:04

## 2022-08-20 RX ADMIN — HEPARIN SODIUM 5000 UNITS: 5000 INJECTION INTRAVENOUS; SUBCUTANEOUS at 15:40

## 2022-08-20 RX ADMIN — SODIUM CHLORIDE, PRESERVATIVE FREE 2 ML: 5 INJECTION INTRAVENOUS at 08:35

## 2022-08-20 RX ADMIN — ATORVASTATIN CALCIUM 20 MG: 20 TABLET, FILM COATED ORAL at 21:08

## 2022-08-20 RX ADMIN — HEPARIN SODIUM 5000 UNITS: 5000 INJECTION INTRAVENOUS; SUBCUTANEOUS at 06:01

## 2022-08-20 RX ADMIN — HEPARIN SODIUM 5000 UNITS: 5000 INJECTION INTRAVENOUS; SUBCUTANEOUS at 21:08

## 2022-08-20 ASSESSMENT — PAIN SCALES - GENERAL
PAINLEVEL_OUTOF10: 0
PAINLEVEL_OUTOF10: 5
PAINLEVEL_OUTOF10: 3
PAINLEVEL_OUTOF10: 5
PAINLEVEL_OUTOF10: 6
PAINLEVEL_OUTOF10: 0
PAINLEVEL_OUTOF10: 7
PAINLEVEL_OUTOF10: 0
PAINLEVEL_OUTOF10: 5
PAINLEVEL_OUTOF10: 6

## 2022-08-21 ENCOUNTER — APPOINTMENT (OUTPATIENT)
Dept: GENERAL RADIOLOGY | Age: 62
DRG: 271 | End: 2022-08-21
Attending: THORACIC SURGERY (CARDIOTHORACIC VASCULAR SURGERY)

## 2022-08-21 LAB
ANION GAP SERPL CALC-SCNC: 10 MMOL/L (ref 7–19)
BASOPHILS # BLD: 0 K/MCL (ref 0–0.3)
BASOPHILS NFR BLD: 0 %
BUN SERPL-MCNC: 10 MG/DL (ref 6–20)
BUN/CREAT SERPL: 20 (ref 7–25)
CALCIUM SERPL-MCNC: 8.8 MG/DL (ref 8.4–10.2)
CHLORIDE SERPL-SCNC: 103 MMOL/L (ref 97–110)
CO2 SERPL-SCNC: 26 MMOL/L (ref 21–32)
CREAT SERPL-MCNC: 0.49 MG/DL (ref 0.51–0.95)
DEPRECATED RDW RBC: 54.4 FL (ref 39–50)
EOSINOPHIL # BLD: 0 K/MCL (ref 0–0.5)
EOSINOPHIL NFR BLD: 0 %
ERYTHROCYTE [DISTWIDTH] IN BLOOD: 15.4 % (ref 11–15)
FASTING DURATION TIME PATIENT: ABNORMAL H
GFR SERPLBLD BASED ON 1.73 SQ M-ARVRAT: >90 ML/MIN
GLUCOSE SERPL-MCNC: 122 MG/DL (ref 70–99)
HCT VFR BLD CALC: 33 % (ref 36–46.5)
HGB BLD-MCNC: 11.1 G/DL (ref 12–15.5)
IMM GRANULOCYTES # BLD AUTO: 0 K/MCL (ref 0–0.2)
IMM GRANULOCYTES # BLD: 0 %
LYMPHOCYTES # BLD: 0.9 K/MCL (ref 1–4)
LYMPHOCYTES NFR BLD: 16 %
MAGNESIUM SERPL-MCNC: 1.5 MG/DL (ref 1.7–2.4)
MCH RBC QN AUTO: 32.8 PG (ref 26–34)
MCHC RBC AUTO-ENTMCNC: 33.6 G/DL (ref 32–36.5)
MCV RBC AUTO: 97.6 FL (ref 78–100)
MONOCYTES # BLD: 0.9 K/MCL (ref 0.3–0.9)
MONOCYTES NFR BLD: 16 %
NEUTROPHILS # BLD: 3.9 K/MCL (ref 1.8–7.7)
NEUTROPHILS NFR BLD: 68 %
NRBC BLD MANUAL-RTO: 0 /100 WBC
PHOSPHATE SERPL-MCNC: 2.4 MG/DL (ref 2.4–4.7)
PLATELET # BLD AUTO: 139 K/MCL (ref 140–450)
POTASSIUM SERPL-SCNC: 4.5 MMOL/L (ref 3.4–5.1)
RBC # BLD: 3.38 MIL/MCL (ref 4–5.2)
SODIUM SERPL-SCNC: 134 MMOL/L (ref 135–145)
WBC # BLD: 5.7 K/MCL (ref 4.2–11)

## 2022-08-21 PROCEDURE — 85025 COMPLETE CBC W/AUTO DIFF WBC: CPT

## 2022-08-21 PROCEDURE — 10002803 HB RX 637: Performed by: STUDENT IN AN ORGANIZED HEALTH CARE EDUCATION/TRAINING PROGRAM

## 2022-08-21 PROCEDURE — 10002803 HB RX 637: Performed by: INTERNAL MEDICINE

## 2022-08-21 PROCEDURE — 84100 ASSAY OF PHOSPHORUS: CPT

## 2022-08-21 PROCEDURE — 10002803 HB RX 637: Performed by: THORACIC SURGERY (CARDIOTHORACIC VASCULAR SURGERY)

## 2022-08-21 PROCEDURE — 36415 COLL VENOUS BLD VENIPUNCTURE: CPT

## 2022-08-21 PROCEDURE — 10002800 HB RX 250 W HCPCS: Performed by: STUDENT IN AN ORGANIZED HEALTH CARE EDUCATION/TRAINING PROGRAM

## 2022-08-21 PROCEDURE — 10003585 HB ROOM CHARGE INTERMEDIATE CARE

## 2022-08-21 PROCEDURE — 71045 X-RAY EXAM CHEST 1 VIEW: CPT

## 2022-08-21 PROCEDURE — 80048 BASIC METABOLIC PNL TOTAL CA: CPT

## 2022-08-21 PROCEDURE — 10004651 HB RX, NO CHARGE ITEM: Performed by: THORACIC SURGERY (CARDIOTHORACIC VASCULAR SURGERY)

## 2022-08-21 PROCEDURE — 10004651 HB RX, NO CHARGE ITEM: Performed by: STUDENT IN AN ORGANIZED HEALTH CARE EDUCATION/TRAINING PROGRAM

## 2022-08-21 PROCEDURE — 10002800 HB RX 250 W HCPCS: Performed by: INTERNAL MEDICINE

## 2022-08-21 PROCEDURE — 83735 ASSAY OF MAGNESIUM: CPT

## 2022-08-21 RX ORDER — PANTOPRAZOLE SODIUM 40 MG/1
40 TABLET, DELAYED RELEASE ORAL
Status: DISCONTINUED | OUTPATIENT
Start: 2022-08-21 | End: 2022-08-23 | Stop reason: HOSPADM

## 2022-08-21 RX ADMIN — HEPARIN SODIUM 5000 UNITS: 5000 INJECTION INTRAVENOUS; SUBCUTANEOUS at 14:26

## 2022-08-21 RX ADMIN — MAGNESIUM SULFATE HEPTAHYDRATE 2 G: 40 INJECTION, SOLUTION INTRAVENOUS at 16:58

## 2022-08-21 RX ADMIN — HEPARIN SODIUM 5000 UNITS: 5000 INJECTION INTRAVENOUS; SUBCUTANEOUS at 05:52

## 2022-08-21 RX ADMIN — PANTOPRAZOLE SODIUM 40 MG: 40 TABLET, DELAYED RELEASE ORAL at 09:17

## 2022-08-21 RX ADMIN — ACETAMINOPHEN 1000 MG: 500 TABLET, FILM COATED ORAL at 06:05

## 2022-08-21 RX ADMIN — SODIUM CHLORIDE, PRESERVATIVE FREE 2 ML: 5 INJECTION INTRAVENOUS at 09:17

## 2022-08-21 RX ADMIN — SODIUM CHLORIDE, PRESERVATIVE FREE 2 ML: 5 INJECTION INTRAVENOUS at 21:24

## 2022-08-21 RX ADMIN — HEPARIN SODIUM 5000 UNITS: 5000 INJECTION INTRAVENOUS; SUBCUTANEOUS at 21:21

## 2022-08-21 RX ADMIN — MAGNESIUM SULFATE HEPTAHYDRATE 2 G: 40 INJECTION, SOLUTION INTRAVENOUS at 14:31

## 2022-08-21 RX ADMIN — SERTRALINE HYDROCHLORIDE 25 MG: 25 TABLET, FILM COATED ORAL at 09:17

## 2022-08-21 RX ADMIN — ATORVASTATIN CALCIUM 20 MG: 20 TABLET, FILM COATED ORAL at 21:21

## 2022-08-21 ASSESSMENT — PAIN SCALES - GENERAL
PAINLEVEL_OUTOF10: 5
PAINLEVEL_OUTOF10: 0
PAINLEVEL_OUTOF10: 6
PAINLEVEL_OUTOF10: 5
PAINLEVEL_OUTOF10: 0
PAINLEVEL_OUTOF10: 5

## 2022-08-21 ASSESSMENT — PAIN SCALES - WONG BAKER
WONGBAKER_NUMERICALRESPONSE: 0

## 2022-08-22 ENCOUNTER — APPOINTMENT (OUTPATIENT)
Dept: GENERAL RADIOLOGY | Age: 62
DRG: 271 | End: 2022-08-22
Attending: THORACIC SURGERY (CARDIOTHORACIC VASCULAR SURGERY)

## 2022-08-22 ENCOUNTER — APPOINTMENT (OUTPATIENT)
Dept: CARDIOLOGY | Age: 62
DRG: 271 | End: 2022-08-22
Attending: INTERNAL MEDICINE

## 2022-08-22 LAB
ANION GAP SERPL CALC-SCNC: 8 MMOL/L (ref 7–19)
BASOPHILS # BLD: 0 K/MCL (ref 0–0.3)
BASOPHILS NFR BLD: 0 %
BUN SERPL-MCNC: 8 MG/DL (ref 6–20)
BUN/CREAT SERPL: 17 (ref 7–25)
CALCIUM SERPL-MCNC: 8.4 MG/DL (ref 8.4–10.2)
CHLORIDE SERPL-SCNC: 105 MMOL/L (ref 97–110)
CO2 SERPL-SCNC: 27 MMOL/L (ref 21–32)
CREAT SERPL-MCNC: 0.48 MG/DL (ref 0.51–0.95)
DEPRECATED RDW RBC: 53.5 FL (ref 39–50)
EOSINOPHIL # BLD: 0 K/MCL (ref 0–0.5)
EOSINOPHIL NFR BLD: 0 %
ERYTHROCYTE [DISTWIDTH] IN BLOOD: 15.1 % (ref 11–15)
FASTING DURATION TIME PATIENT: ABNORMAL H
GFR SERPLBLD BASED ON 1.73 SQ M-ARVRAT: >90 ML/MIN
GLUCOSE SERPL-MCNC: 121 MG/DL (ref 70–99)
HCT VFR BLD CALC: 33.7 % (ref 36–46.5)
HGB BLD-MCNC: 11.1 G/DL (ref 12–15.5)
IMM GRANULOCYTES # BLD AUTO: 0 K/MCL (ref 0–0.2)
IMM GRANULOCYTES # BLD: 0 %
LYMPHOCYTES # BLD: 1 K/MCL (ref 1–4)
LYMPHOCYTES NFR BLD: 26 %
MAGNESIUM SERPL-MCNC: 1.9 MG/DL (ref 1.7–2.4)
MCH RBC QN AUTO: 32.3 PG (ref 26–34)
MCHC RBC AUTO-ENTMCNC: 32.9 G/DL (ref 32–36.5)
MCV RBC AUTO: 98 FL (ref 78–100)
MONOCYTES # BLD: 0.6 K/MCL (ref 0.3–0.9)
MONOCYTES NFR BLD: 15 %
NEUTROPHILS # BLD: 2.4 K/MCL (ref 1.8–7.7)
NEUTROPHILS NFR BLD: 59 %
NRBC BLD MANUAL-RTO: 0 /100 WBC
PHOSPHATE SERPL-MCNC: 1.9 MG/DL (ref 2.4–4.7)
PLATELET # BLD AUTO: 157 K/MCL (ref 140–450)
POTASSIUM SERPL-SCNC: 3.7 MMOL/L (ref 3.4–5.1)
RBC # BLD: 3.44 MIL/MCL (ref 4–5.2)
SARS-COV-2 RNA RESP QL NAA+PROBE: NOT DETECTED
SERVICE CMNT-IMP: NORMAL
SERVICE CMNT-IMP: NORMAL
SODIUM SERPL-SCNC: 136 MMOL/L (ref 135–145)
WBC # BLD: 4 K/MCL (ref 4.2–11)

## 2022-08-22 PROCEDURE — 10002807 HB RX 258: Performed by: THORACIC SURGERY (CARDIOTHORACIC VASCULAR SURGERY)

## 2022-08-22 PROCEDURE — 10004651 HB RX, NO CHARGE ITEM: Performed by: STUDENT IN AN ORGANIZED HEALTH CARE EDUCATION/TRAINING PROGRAM

## 2022-08-22 PROCEDURE — 10003585 HB ROOM CHARGE INTERMEDIATE CARE

## 2022-08-22 PROCEDURE — 10004281 HB COUNTER-STAFF TIME PER 15 MIN

## 2022-08-22 PROCEDURE — 83735 ASSAY OF MAGNESIUM: CPT

## 2022-08-22 PROCEDURE — 10002803 HB RX 637: Performed by: THORACIC SURGERY (CARDIOTHORACIC VASCULAR SURGERY)

## 2022-08-22 PROCEDURE — 93321 DOPPLER ECHO F-UP/LMTD STD: CPT | Performed by: INTERNAL MEDICINE

## 2022-08-22 PROCEDURE — 10002800 HB RX 250 W HCPCS: Performed by: STUDENT IN AN ORGANIZED HEALTH CARE EDUCATION/TRAINING PROGRAM

## 2022-08-22 PROCEDURE — 10002807 HB RX 258: Performed by: INTERNAL MEDICINE

## 2022-08-22 PROCEDURE — 84100 ASSAY OF PHOSPHORUS: CPT

## 2022-08-22 PROCEDURE — 85025 COMPLETE CBC W/AUTO DIFF WBC: CPT

## 2022-08-22 PROCEDURE — 93308 TTE F-UP OR LMTD: CPT | Performed by: INTERNAL MEDICINE

## 2022-08-22 PROCEDURE — 80048 BASIC METABOLIC PNL TOTAL CA: CPT

## 2022-08-22 PROCEDURE — U0005 INFEC AGEN DETEC AMPLI PROBE: HCPCS | Performed by: INTERNAL MEDICINE

## 2022-08-22 PROCEDURE — 93321 DOPPLER ECHO F-UP/LMTD STD: CPT

## 2022-08-22 PROCEDURE — 93325 DOPPLER ECHO COLOR FLOW MAPG: CPT | Performed by: INTERNAL MEDICINE

## 2022-08-22 PROCEDURE — 36415 COLL VENOUS BLD VENIPUNCTURE: CPT

## 2022-08-22 PROCEDURE — 10002803 HB RX 637: Performed by: STUDENT IN AN ORGANIZED HEALTH CARE EDUCATION/TRAINING PROGRAM

## 2022-08-22 PROCEDURE — 10002803 HB RX 637: Performed by: INTERNAL MEDICINE

## 2022-08-22 PROCEDURE — 10002801 HB RX 250 W/O HCPCS: Performed by: INTERNAL MEDICINE

## 2022-08-22 PROCEDURE — 13003289 HB OXYGEN THERAPY DAILY

## 2022-08-22 PROCEDURE — 13003287

## 2022-08-22 PROCEDURE — 71045 X-RAY EXAM CHEST 1 VIEW: CPT

## 2022-08-22 RX ADMIN — PANTOPRAZOLE SODIUM 40 MG: 40 TABLET, DELAYED RELEASE ORAL at 06:11

## 2022-08-22 RX ADMIN — ATORVASTATIN CALCIUM 20 MG: 20 TABLET, FILM COATED ORAL at 21:32

## 2022-08-22 RX ADMIN — HEPARIN SODIUM 5000 UNITS: 5000 INJECTION INTRAVENOUS; SUBCUTANEOUS at 13:11

## 2022-08-22 RX ADMIN — SODIUM CHLORIDE, POTASSIUM CHLORIDE, SODIUM LACTATE AND CALCIUM CHLORIDE: 600; 310; 30; 20 INJECTION, SOLUTION INTRAVENOUS at 18:17

## 2022-08-22 RX ADMIN — HEPARIN SODIUM 5000 UNITS: 5000 INJECTION INTRAVENOUS; SUBCUTANEOUS at 06:11

## 2022-08-22 RX ADMIN — SODIUM PHOSPHATE, MONOBASIC, MONOHYDRATE 30 MMOL: 276; 142 INJECTION, SOLUTION INTRAVENOUS at 13:18

## 2022-08-22 RX ADMIN — COLCHICINE 0.6 MG: 0.6 TABLET, FILM COATED ORAL at 08:45

## 2022-08-22 RX ADMIN — HEPARIN SODIUM 5000 UNITS: 5000 INJECTION INTRAVENOUS; SUBCUTANEOUS at 21:33

## 2022-08-22 RX ADMIN — SODIUM CHLORIDE, PRESERVATIVE FREE 2 ML: 5 INJECTION INTRAVENOUS at 21:32

## 2022-08-22 RX ADMIN — SODIUM CHLORIDE, PRESERVATIVE FREE 2 ML: 5 INJECTION INTRAVENOUS at 08:49

## 2022-08-22 RX ADMIN — SERTRALINE HYDROCHLORIDE 25 MG: 25 TABLET, FILM COATED ORAL at 08:45

## 2022-08-22 RX ADMIN — Medication: at 18:18

## 2022-08-22 ASSESSMENT — PAIN SCALES - WONG BAKER
WONGBAKER_NUMERICALRESPONSE: 0

## 2022-08-22 ASSESSMENT — PAIN SCALES - GENERAL
PAINLEVEL_OUTOF10: 0
PAINLEVEL_OUTOF10: 4
PAINLEVEL_OUTOF10: 0
PAINLEVEL_OUTOF10: 0

## 2022-08-23 ENCOUNTER — APPOINTMENT (OUTPATIENT)
Dept: GENERAL RADIOLOGY | Age: 62
DRG: 271 | End: 2022-08-23
Attending: THORACIC SURGERY (CARDIOTHORACIC VASCULAR SURGERY)

## 2022-08-23 VITALS
SYSTOLIC BLOOD PRESSURE: 131 MMHG | RESPIRATION RATE: 20 BRPM | BODY MASS INDEX: 37.58 KG/M2 | TEMPERATURE: 98.8 F | OXYGEN SATURATION: 92 % | WEIGHT: 212.08 LBS | HEIGHT: 63 IN | HEART RATE: 97 BPM | DIASTOLIC BLOOD PRESSURE: 83 MMHG

## 2022-08-23 LAB
ANION GAP SERPL CALC-SCNC: 8 MMOL/L (ref 7–19)
BACTERIA SPEC ANAEROBE+AEROBE CULT: NORMAL
BASOPHILS # BLD: 0 K/MCL (ref 0–0.3)
BASOPHILS NFR BLD: 0 %
BUN SERPL-MCNC: 8 MG/DL (ref 6–20)
BUN/CREAT SERPL: 15 (ref 7–25)
CALCIUM SERPL-MCNC: 9 MG/DL (ref 8.4–10.2)
CHLORIDE SERPL-SCNC: 106 MMOL/L (ref 97–110)
CO2 SERPL-SCNC: 30 MMOL/L (ref 21–32)
CREAT SERPL-MCNC: 0.54 MG/DL (ref 0.51–0.95)
DEPRECATED RDW RBC: 53.2 FL (ref 39–50)
EOSINOPHIL # BLD: 0 K/MCL (ref 0–0.5)
EOSINOPHIL NFR BLD: 1 %
ERYTHROCYTE [DISTWIDTH] IN BLOOD: 15.1 % (ref 11–15)
FASTING DURATION TIME PATIENT: ABNORMAL H
GFR SERPLBLD BASED ON 1.73 SQ M-ARVRAT: >90 ML/MIN
GLUCOSE SERPL-MCNC: 106 MG/DL (ref 70–99)
GRAM STN SPEC: NORMAL
HCT VFR BLD CALC: 34.1 % (ref 36–46.5)
HGB BLD-MCNC: 11.3 G/DL (ref 12–15.5)
IMM GRANULOCYTES # BLD AUTO: 0 K/MCL (ref 0–0.2)
IMM GRANULOCYTES # BLD: 0 %
LYMPHOCYTES # BLD: 1 K/MCL (ref 1–4)
LYMPHOCYTES NFR BLD: 29 %
MAGNESIUM SERPL-MCNC: 1.7 MG/DL (ref 1.7–2.4)
MCH RBC QN AUTO: 32.1 PG (ref 26–34)
MCHC RBC AUTO-ENTMCNC: 33.1 G/DL (ref 32–36.5)
MCV RBC AUTO: 96.9 FL (ref 78–100)
MONOCYTES # BLD: 0.5 K/MCL (ref 0.3–0.9)
MONOCYTES NFR BLD: 14 %
NEUTROPHILS # BLD: 2 K/MCL (ref 1.8–7.7)
NEUTROPHILS NFR BLD: 56 %
NRBC BLD MANUAL-RTO: 0 /100 WBC
PHOSPHATE SERPL-MCNC: 3.1 MG/DL (ref 2.4–4.7)
PLATELET # BLD AUTO: 170 K/MCL (ref 140–450)
POTASSIUM SERPL-SCNC: 4 MMOL/L (ref 3.4–5.1)
RBC # BLD: 3.52 MIL/MCL (ref 4–5.2)
SODIUM SERPL-SCNC: 140 MMOL/L (ref 135–145)
WBC # BLD: 3.6 K/MCL (ref 4.2–11)

## 2022-08-23 PROCEDURE — 71045 X-RAY EXAM CHEST 1 VIEW: CPT

## 2022-08-23 PROCEDURE — 10002803 HB RX 637: Performed by: THORACIC SURGERY (CARDIOTHORACIC VASCULAR SURGERY)

## 2022-08-23 PROCEDURE — 80048 BASIC METABOLIC PNL TOTAL CA: CPT

## 2022-08-23 PROCEDURE — 36415 COLL VENOUS BLD VENIPUNCTURE: CPT

## 2022-08-23 PROCEDURE — 84100 ASSAY OF PHOSPHORUS: CPT

## 2022-08-23 PROCEDURE — 10002803 HB RX 637: Performed by: STUDENT IN AN ORGANIZED HEALTH CARE EDUCATION/TRAINING PROGRAM

## 2022-08-23 PROCEDURE — 10002800 HB RX 250 W HCPCS: Performed by: STUDENT IN AN ORGANIZED HEALTH CARE EDUCATION/TRAINING PROGRAM

## 2022-08-23 PROCEDURE — 83735 ASSAY OF MAGNESIUM: CPT

## 2022-08-23 PROCEDURE — 10004651 HB RX, NO CHARGE ITEM: Performed by: STUDENT IN AN ORGANIZED HEALTH CARE EDUCATION/TRAINING PROGRAM

## 2022-08-23 PROCEDURE — 85025 COMPLETE CBC W/AUTO DIFF WBC: CPT

## 2022-08-23 RX ORDER — ACETAMINOPHEN 500 MG
1000 TABLET ORAL EVERY 6 HOURS PRN
INPATIENT
Start: 2022-08-23

## 2022-08-23 RX ORDER — COLCHICINE 0.6 MG/1
0.6 TABLET ORAL DAILY
Qty: 7 TABLET | Refills: 1 | Status: SHIPPED | OUTPATIENT
Start: 2022-08-23 | End: 2022-08-30

## 2022-08-23 RX ORDER — ATORVASTATIN CALCIUM 20 MG/1
20 TABLET, FILM COATED ORAL NIGHTLY
Qty: 30 TABLET | Refills: 1 | Status: SHIPPED | OUTPATIENT
Start: 2022-08-23 | End: 2022-09-22

## 2022-08-23 RX ADMIN — HEPARIN SODIUM 5000 UNITS: 5000 INJECTION INTRAVENOUS; SUBCUTANEOUS at 05:02

## 2022-08-23 RX ADMIN — SODIUM CHLORIDE, PRESERVATIVE FREE 2 ML: 5 INJECTION INTRAVENOUS at 08:39

## 2022-08-23 RX ADMIN — HEPARIN SODIUM 5000 UNITS: 5000 INJECTION INTRAVENOUS; SUBCUTANEOUS at 13:50

## 2022-08-23 RX ADMIN — SERTRALINE HYDROCHLORIDE 25 MG: 25 TABLET, FILM COATED ORAL at 08:39

## 2022-08-23 RX ADMIN — PANTOPRAZOLE SODIUM 40 MG: 40 TABLET, DELAYED RELEASE ORAL at 05:02

## 2022-08-23 ASSESSMENT — PAIN SCALES - GENERAL
PAINLEVEL_OUTOF10: 0

## 2022-08-23 ASSESSMENT — PAIN SCALES - WONG BAKER: WONGBAKER_NUMERICALRESPONSE: 0

## 2022-08-24 LAB
ASR DISCLAIMER: NORMAL
CASE RPRT: NORMAL
CLINICAL INFO: NORMAL
PATH REPORT.FINAL DX SPEC: NORMAL
PATH REPORT.GROSS SPEC: NORMAL

## 2022-08-25 LAB
ASR DISCLAIMER: NORMAL
CASE RPRT: NORMAL
CLINICAL INFO: NORMAL
PATH REPORT.FINAL DX SPEC: NORMAL
PATH REPORT.FINAL DX SPEC: NORMAL
PATH REPORT.GROSS SPEC: NORMAL

## 2022-08-27 ENCOUNTER — HOSPITAL ENCOUNTER (OUTPATIENT)
Dept: GENERAL RADIOLOGY | Age: 62
Discharge: HOME | End: 2022-08-27
Attending: THORACIC SURGERY (CARDIOTHORACIC VASCULAR SURGERY)

## 2022-08-27 DIAGNOSIS — J90 PLEURAL EFFUSION: ICD-10-CM

## 2022-08-27 PROCEDURE — 71046 X-RAY EXAM CHEST 2 VIEWS: CPT

## 2022-12-01 ENCOUNTER — HOSPITAL ENCOUNTER (OUTPATIENT)
Age: 62
End: 2022-12-01
Attending: THORACIC SURGERY (CARDIOTHORACIC VASCULAR SURGERY) | Admitting: THORACIC SURGERY (CARDIOTHORACIC VASCULAR SURGERY)

## (undated) DEVICE — KIT DRN VLV CAP PLEURX DISP STRL LF PLR

## (undated) DEVICE — SUTURE VICRYL 0 CP-1 27IN BRAID COAT ABS UNDYED J267H

## (undated) DEVICE — DONUT PSTN 7IN HEAD FOAM

## (undated) DEVICE — SUTURE PROLENE 4-0 SH 36IN 2 ARM MONO NABSB BLUE 8521H

## (undated) DEVICE — TUBING SCT CLR 12FT 316IN MDVC MAXI-GRIP NCDTV MALE TO MALE

## (undated) DEVICE — BULB 100CC SIL DRN LTWT LOW LVL SCT WND DRN STRL LF DISP

## (undated) DEVICE — Device

## (undated) DEVICE — DRAIN INCS ATR OASIS PLASTIC CHEST THOR TUBE DRY SCT

## (undated) DEVICE — ELECTRODE ESURG 10FT 2 DSPR ADH BRDR PULL TAB PREATTACH CBL

## (undated) DEVICE — SUTURE MONOCRYL MTPS 3-0 PS2 18IN MONO ABS UNDYED

## (undated) DEVICE — SUTURE PRMHND 0 30IN SILK BRAID TIES 6 STRN PCUT NABSB BLK A306H

## (undated) DEVICE — BLADE SAW THK.025IN 6.3MM CUT EDGE STRNM STRL LF

## (undated) DEVICE — DRAIN INCS 15FR 316IN FULL FLUTE RND 316IN BARD SIL 4 FREE

## (undated) DEVICE — SYSTEM IMG CLEARIFY 8X6IN WARM HUB TROCAR WIPE MRFBR DISP

## (undated) DEVICE — TROCAR LAPSCP 10.5MM THORPRT THRD BLUNT TIP OBT NCDTV SLV

## (undated) DEVICE — SUTURE VICRYL 0 CT1 36IN BRAID COAT ABS UNDYED J946H

## (undated) NOTE — ED AVS SNAPSHOT
Olegario Coon   MRN: [id number]    Department:  [facility] Emergency Department   Date of Visit:  2/20/2018           Disclosure     Insurance plans vary and the physician(s) referred by the ER may not be covered by your plan.  Please contact tell this physician (or your personal doctor if your instructions are to return to your personal doctor) about any new or lasting problems. The primary care or specialist physician will see patients referred from the [facility] Emergency Department.  Wing Vaca